# Patient Record
Sex: MALE | Race: WHITE
[De-identification: names, ages, dates, MRNs, and addresses within clinical notes are randomized per-mention and may not be internally consistent; named-entity substitution may affect disease eponyms.]

---

## 2019-09-01 ENCOUNTER — HOSPITAL ENCOUNTER (EMERGENCY)
Dept: HOSPITAL 59 - ER | Age: 24
Discharge: HOME | End: 2019-09-01
Payer: MEDICAID

## 2019-09-01 DIAGNOSIS — S62.353A: Primary | ICD-10-CM

## 2019-09-01 DIAGNOSIS — F17.210: ICD-10-CM
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
## 2019-09-01 DIAGNOSIS — S62.355A: ICD-10-CM

## 2019-09-01 PROCEDURE — 99283 EMERGENCY DEPT VISIT LOW MDM: CPT
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
